# Patient Record
Sex: MALE | Race: WHITE | Employment: UNEMPLOYED | URBAN - METROPOLITAN AREA
[De-identification: names, ages, dates, MRNs, and addresses within clinical notes are randomized per-mention and may not be internally consistent; named-entity substitution may affect disease eponyms.]

---

## 2017-12-15 ENCOUNTER — HOSPITAL ENCOUNTER (EMERGENCY)
Age: 27
Discharge: HOME | End: 2017-12-15
Attending: EMERGENCY MEDICINE
Payer: SELF-PAY

## 2017-12-15 VITALS
OXYGEN SATURATION: 98 % | BODY MASS INDEX: 31.18 KG/M2 | TEMPERATURE: 98.2 F | HEART RATE: 66 BPM | SYSTOLIC BLOOD PRESSURE: 117 MMHG | WEIGHT: 194 LBS | HEIGHT: 66 IN | DIASTOLIC BLOOD PRESSURE: 73 MMHG | RESPIRATION RATE: 16 BRPM

## 2017-12-15 DIAGNOSIS — K02.9 PAIN DUE TO DENTAL CARIES: Primary | ICD-10-CM

## 2017-12-15 PROCEDURE — 99282 EMERGENCY DEPT VISIT SF MDM: CPT

## 2017-12-15 RX ORDER — PENICILLIN V POTASSIUM 500 MG/1
500 TABLET, FILM COATED ORAL 4 TIMES DAILY
Qty: 28 TAB | Refills: 0 | Status: SHIPPED | OUTPATIENT
Start: 2017-12-15 | End: 2018-05-04

## 2017-12-15 RX ORDER — IBUPROFEN 600 MG/1
600 TABLET ORAL
Qty: 20 TAB | Refills: 0 | Status: SHIPPED | OUTPATIENT
Start: 2017-12-15 | End: 2018-05-04

## 2017-12-16 NOTE — ED PROVIDER NOTES
EMERGENCY DEPARTMENT HISTORY AND PHYSICAL EXAM    8:14 PM      Date: 12/15/2017  Patient Name: Liam Horan    History of Presenting Illness     Chief Complaint   Patient presents with    Dental Pain         History Provided By: Patient    Chief Complaint: Right, upper dental pain  Duration:  Months (x6)  Timing:  Chronic  Location: Right, upper molar  Quality: Sharp  Severity: 10 out of 10  Modifying Factors: No identifiable modifying factors  Associated Symptoms: denies any other associated signs or symptoms      Additional History (Context): Liam Horan is a 32 y.o. male with hypertension who presents to the ED c/o chronic right upper dental pain. Pt moved here from [place] earlier this year and was told prior to moving that he needed to have that tooth extracted prior to moving, although he did not follow through with that. Pt was in the ED six month ago for the same dental complication and was prescriped antibiotics at this visit. The pt states he did not see a dentist following that ED visit because he does not have insurance and he did not think he was given the reference for affordable dental care. Pt admits to smoking cigarettes. PCP: None    Current Outpatient Prescriptions   Medication Sig Dispense Refill    metoprolol tartrate (LOPRESSOR) 25 mg tablet Take 25 mg by mouth two (2) times a day. Indications: HYPERTENSION, Pt states it's prescribed 1-2 times AS NEEDED         Past History     Past Medical History:  Past Medical History:   Diagnosis Date    Hypertension        Past Surgical History:  History reviewed. No pertinent surgical history. Family History:  History reviewed. No pertinent family history. Social History:  Social History   Substance Use Topics    Smoking status: Current Every Day Smoker    Smokeless tobacco: Never Used    Alcohol use Yes       Allergies:  No Known Allergies      Review of Systems     Review of Systems   Constitutional: Negative.     HENT: Positive for dental problem (right upper). All other systems reviewed and are negative. Physical Exam     Visit Vitals    /73 (BP 1 Location: Right arm, BP Patient Position: At rest)    Pulse 66    Temp 98.2 °F (36.8 °C)    Resp 16    Ht 5' 6\" (1.676 m)    Wt 88 kg (194 lb)    SpO2 98%    BMI 31.31 kg/m2     Physical Exam   Constitutional: He is oriented to person, place, and time. He appears well-developed and well-nourished. No distress. HENT:   Head: Normocephalic and atraumatic. Mouth/Throat: Oropharynx is clear and moist.   Right upper molar erupting with poor dentition throughout; no fluctuance; mild gingival inflammation    Eyes: Conjunctivae and EOM are normal. Pupils are equal, round, and reactive to light. No scleral icterus. Neck: Normal range of motion. Neck supple. Cardiovascular: Normal rate, regular rhythm and normal heart sounds. No murmur heard. Pulmonary/Chest: Effort normal and breath sounds normal. No respiratory distress. Abdominal: Soft. Bowel sounds are normal. He exhibits no distension. There is no tenderness. Musculoskeletal: He exhibits no edema. Lymphadenopathy:     He has no cervical adenopathy. Neurological: He is alert and oriented to person, place, and time. Coordination normal.   Skin: Skin is warm and dry. No rash noted. Psychiatric: He has a normal mood and affect. His behavior is normal.   Nursing note and vitals reviewed. Diagnostic Study Results     Labs -  No results found for this or any previous visit (from the past 12 hour(s)). Radiologic Studies -   No orders to display         Medical Decision Making   I am the first provider for this patient. I reviewed the vital signs, available nursing notes, past medical history, past surgical history, family history and social history. Vital Signs-Reviewed the patient's vital signs. Pulse Oximetry Analysis -  98% on room air     Records Reviewed:  Old Medical Records (Time of Review: 8:14 PM)    ED Course: Progress Notes, Reevaluation, and Consults:      Provider Notes (Medical Decision Making): ? Infection vs wisdom tooth eruption will tx referred to Dental follow up     Procedures:     Core Measures:     Critical Care Time:       Diagnosis     Clinical Impression:   1. Pain due to dental caries        Disposition: home     Follow-up Information     None           Patient's Medications   Start Taking    No medications on file   Continue Taking    METOPROLOL TARTRATE (LOPRESSOR) 25 MG TABLET    Take 25 mg by mouth two (2) times a day. Indications: HYPERTENSION, Pt states it's prescribed 1-2 times AS NEEDED   These Medications have changed    No medications on file   Stop Taking    No medications on file     _______________________________    Attestations:  [address] [place] [address] acting as a scribe for and in the presence of Sharon Shah MD      December 15, 2017 at 8:14 PM       Provider Attestation:      I personally performed the services described in the documentation, reviewed the documentation, as recorded by the scribe in my presence, and it accurately and completely records my words and actions.  December 15, 2017 at 8:14 PM - Sharon Shah MD    _______________________________

## 2017-12-16 NOTE — DISCHARGE INSTRUCTIONS
Tooth Decay: Care Instructions  Your Care Instructions    Tooth decay is damage to a tooth caused by plaque. Plaque is a thin film of bacteria that sticks to the teeth above and below the gum line. If plaque isn't removed from the teeth, it can build up and harden into tartar. The bacteria in plaque and tartar use sugars in food to make acids. These acids can cause tooth decay and gum disease. Any part of your tooth can decay, from the roots below the gum line to the chewing surface. Decay can affect the outer layer (enamel) or inner layer (dentin) of your teeth. The deeper the decay, the worse the damage. Untreated tooth decay will get worse and may lead to tooth loss. If you have a small hole (cavity) in your tooth, your dentist can repair it by removing the decay and filling the hole. If you have deeper decay, you may need more treatment. A very badly damaged tooth may have to be removed. Follow-up care is a key part of your treatment and safety. Be sure to make and go to all appointments, and call your dentist if you are having problems. It's also a good idea to know your test results and keep a list of the medicines you take. How can you care for yourself at home? If you have pain:  · Take an over-the-counter pain medicine, such as acetaminophen (Tylenol), ibuprofen (Advil, Motrin), or naproxen (Aleve). Be safe with medicines. Read and follow all instructions on the label. ¨ Do not take two or more pain medicines at the same time unless the doctor told you to. Many pain medicines have acetaminophen, which is Tylenol. Too much acetaminophen (Tylenol) can be harmful. · Put ice or a cold pack on your cheek over the tooth for 10 to 15 minutes at a time. Put a thin cloth between the ice and your skin. To prevent tooth decay  · Brush teeth twice a day, and floss once a day. Brushing with fluoride toothpaste and flossing may be enough to reverse early decay.   · Use a toothbrush with soft, rounded-end bristles and a head that is small enough to reach all parts of your teeth and mouth. Replace your toothbrush every 3 or 4 months. You may also use an electric toothbrush that has rotating and oscillating (back-and-forth) action. · Ask your dentist about having fluoride treatments at the dental office. · Brush your tongue to help get rid of bacteria. · Eat healthy foods that include whole grains, vegetables, and fruits. · Have your teeth cleaned by a professional at least two times a year. · Do not smoke or use smokeless tobacco. Tobacco can make tooth decay worse. When should you call for help? Call 911 anytime you think you may need emergency care. For example, call if:  ? · You have trouble breathing. ?Call your dentist now or seek immediate medical care if:  ? · You have new or worse symptoms of infection, such as:  ¨ Increased pain, swelling, warmth, or redness. ¨ Red streaks leading from the area. ¨ Pus draining from the area. ¨ A fever. ? Watch closely for changes in your health, and be sure to contact your doctor if:  ? · You do not get better as expected. Where can you learn more? Go to http://prashanth-robel.info/. Enter T244 in the search box to learn more about \"Tooth Decay: Care Instructions. \"  Current as of: May 12, 2017  Content Version: 11.4  © 3590-1684 [employer]. Care instructions adapted under license by [employer] (which disclaims liability or warranty for this information). If you have questions about a medical condition or this instruction, always ask your healthcare professional. [place] [address] any warranty or liability for your use of this information.

## 2018-01-11 NOTE — MISCELLANEOUS
Message  Pt was admitted 6/18/16 to [facility] with suicidal ideation and discharged 6/23/16  Pt stated he is homeless and is in [place] with his mother and will follow up there  Declined to make [facility] appointment at this time  Hospital documents obtained  [address]  Active Problems    1  ADHD (attention deficit hyperactivity disorder), combined type (314 01) (F90 2)   2  Benign essential hypertension (401 1) (I10)   3  Bipolar affective disorder (296 80) (F31 9)   4  CN (constipation) (564 00) (K59 00)   5  Generalized anxiety disorder (300 02) (F41 1)   6  Influenza vaccine needed (V04 81) (Z23)   7  Marijuana abuse (305 20) (F12 10)   8  Need for Tdap vaccination (V06 1) (Z23)   9  Sciatica of right side (724 3) (M54 31)   10  Sinus infection (473 9) (J32 9)   11  Tooth abscess (522 5) (K04 7)    Current Meds   1  Adderall XR 30 MG Oral Capsule Extended Release 24 Hour   (Amphetamine-Dextroamphet ER); TAKE 2 CAPSULE Daily; Therapy: 87UUH0339 to (Evaluate:21Fga7745); Last Rx:04Utx2624 Ordered   2  ALPRAZolam 1 MG Oral Tablet; TAKE 1 TABLET TWICE DAILY; Therapy: 40UON2969 to (Evaluate:25Mar2016); Last Rx:69Ujf5188 Ordered   3  Brintellix 10 MG TABS; Take 1 tablet daily; Therapy: 28TTN8708 to (Evaluate:17Jun2016)  Requested for: 21Mar2016; Last   Rx:19Mar2016 Ordered   4  Clindamycin HCl - 300 MG Oral Capsule; TAKE 1 CAPSULE EVERY 6 HOURS DAILY; Therapy: 97RNL1463 to (Donelda Violet)  Requested for: 77TAV5579; Last   Rx:15Mar2016 Ordered   5  Docusate Sodium 100 MG Oral Capsule; TAKE 1 CAPSULE TWICE DAILY AS NEEDED; Therapy: 22TTV6669 to (Last Rx:12Nov2015) Ordered   6  Lactulose 10 GM/15ML Oral Solution; TAKE 2 TBSP TWICE DAILY  Requested for:   21OOY1045; Last Rx:17Mar2016 Ordered   7  Metoprolol Tartrate 25 MG Oral Tablet; take one tablet by mouth twice daily; Therapy: 62Ksu6929 to (Evaluate:63Yvh6459)  Requested for: 39IYP1717; Last   Rx:15Mar2016 Ordered   8   Naproxen 500 MG Oral Tablet; TAKE 1 TABLET EVERY 12 HOURS WITH FOOD; Therapy: 63Czw7247 to ([phone])  Requested for: 94NLK3523; Last   Rx:19Jan2016 Ordered   9  Nystop 010509 UNIT/GM External Powder; APPLY 2-3 TIMES DAILY TO AFFECTED   AREA(S); Therapy: 02Yzp1615 to (Last Rx:15Mar2016)  Requested for: 86ETW7722 Ordered   10  Omeprazole 20 MG Oral Tablet Delayed Release; Take one capsule 2x daily; Therapy: 59AXO8082 to (Last Rx:15Mar2016)  Requested for: 27IDG4890 Ordered   11  Penicillin V Potassium 500 MG Oral Tablet; Take 1 twice daily; Therapy: 12NEQ0362 to (Last IQ:01MXR3892)  Requested for: 99WVU3787 Ordered   12  Proventil  (90 Base) MCG/ACT Inhalation Aerosol Solution; INHALE 1 TO 2    PUFFS EVERY 4 TO 6 HOURS AS NEEDED; Therapy: 70Kma3757 to Recorded   13  Suboxone 8-2 MG Sublingual Film; use as directed; Therapy: (Recorded:12Nov2015) to Recorded    Allergies    1  No Known Drug Allergies    2   Seasonal    Signatures   Electronically signed by : Lizabeth Fernandez, ; Jun 27 2016  1:37PM EST                       (Author)

## 2018-05-04 ENCOUNTER — APPOINTMENT (OUTPATIENT)
Dept: GENERAL RADIOLOGY | Age: 28
End: 2018-05-04
Attending: EMERGENCY MEDICINE
Payer: SELF-PAY

## 2018-05-04 ENCOUNTER — HOSPITAL ENCOUNTER (EMERGENCY)
Age: 28
Discharge: HOME | End: 2018-05-04
Attending: EMERGENCY MEDICINE | Admitting: EMERGENCY MEDICINE
Payer: SELF-PAY

## 2018-05-04 VITALS
WEIGHT: 187 LBS | BODY MASS INDEX: 30.18 KG/M2 | HEART RATE: 50 BPM | OXYGEN SATURATION: 98 % | RESPIRATION RATE: 17 BRPM | TEMPERATURE: 97.4 F | DIASTOLIC BLOOD PRESSURE: 61 MMHG | SYSTOLIC BLOOD PRESSURE: 112 MMHG

## 2018-05-04 DIAGNOSIS — R00.2 PALPITATIONS: Primary | ICD-10-CM

## 2018-05-04 LAB
ANION GAP SERPL CALC-SCNC: 6 MMOL/L (ref 3–18)
BASOPHILS # BLD: 0 K/UL (ref 0–0.06)
BASOPHILS NFR BLD: 1 % (ref 0–2)
BUN SERPL-MCNC: 7 MG/DL (ref 7–18)
BUN/CREAT SERPL: 7 (ref 12–20)
CALCIUM SERPL-MCNC: 8.7 MG/DL (ref 8.5–10.1)
CHLORIDE SERPL-SCNC: 107 MMOL/L (ref 100–108)
CO2 SERPL-SCNC: 28 MMOL/L (ref 21–32)
CREAT SERPL-MCNC: 1 MG/DL (ref 0.6–1.3)
DIFFERENTIAL METHOD BLD: NORMAL
EOSINOPHIL # BLD: 0.1 K/UL (ref 0–0.4)
EOSINOPHIL NFR BLD: 1 % (ref 0–5)
ERYTHROCYTE [DISTWIDTH] IN BLOOD BY AUTOMATED COUNT: 12.3 % (ref 11.6–14.5)
GLUCOSE SERPL-MCNC: 82 MG/DL (ref 74–99)
HCT VFR BLD AUTO: 41.3 % (ref 36–48)
HGB BLD-MCNC: 14.5 G/DL (ref 13–16)
LYMPHOCYTES # BLD: 1.3 K/UL (ref 0.9–3.6)
LYMPHOCYTES NFR BLD: 22 % (ref 21–52)
MCH RBC QN AUTO: 30.5 PG (ref 24–34)
MCHC RBC AUTO-ENTMCNC: 35.1 G/DL (ref 31–37)
MCV RBC AUTO: 86.8 FL (ref 74–97)
MONOCYTES # BLD: 0.6 K/UL (ref 0.05–1.2)
MONOCYTES NFR BLD: 9 % (ref 3–10)
NEUTS SEG # BLD: 4 K/UL (ref 1.8–8)
NEUTS SEG NFR BLD: 67 % (ref 40–73)
PLATELET # BLD AUTO: 226 K/UL (ref 135–420)
PMV BLD AUTO: 9.4 FL (ref 9.2–11.8)
POTASSIUM SERPL-SCNC: 3.7 MMOL/L (ref 3.5–5.5)
RBC # BLD AUTO: 4.76 M/UL (ref 4.7–5.5)
SODIUM SERPL-SCNC: 141 MMOL/L (ref 136–145)
WBC # BLD AUTO: 5.9 K/UL (ref 4.6–13.2)

## 2018-05-04 PROCEDURE — 74011250636 HC RX REV CODE- 250/636: Performed by: EMERGENCY MEDICINE

## 2018-05-04 PROCEDURE — 93005 ELECTROCARDIOGRAM TRACING: CPT

## 2018-05-04 PROCEDURE — 96360 HYDRATION IV INFUSION INIT: CPT

## 2018-05-04 PROCEDURE — 99284 EMERGENCY DEPT VISIT MOD MDM: CPT

## 2018-05-04 PROCEDURE — 80048 BASIC METABOLIC PNL TOTAL CA: CPT | Performed by: PHYSICIAN ASSISTANT

## 2018-05-04 PROCEDURE — 85025 COMPLETE CBC W/AUTO DIFF WBC: CPT | Performed by: PHYSICIAN ASSISTANT

## 2018-05-04 PROCEDURE — 71045 X-RAY EXAM CHEST 1 VIEW: CPT

## 2018-05-04 RX ADMIN — SODIUM CHLORIDE 1000 ML: 900 INJECTION, SOLUTION INTRAVENOUS at 20:14

## 2018-05-05 NOTE — ED PROVIDER NOTES
EMERGENCY DEPARTMENT HISTORY AND PHYSICAL EXAM    8:31 PM      Date: 5/4/2018  Patient Name: Lawanda Hubbard    History of Presenting Illness     Chief Complaint   Patient presents with    Palpitations    Dizziness         History Provided By: Patient    Chief Complaint: dizziness  Duration:  Hours  Timing:  Acute  Location: n/a  Quality: n/a  Severity: Moderate  Modifying Factors: Denies any modifying factors. Associated Symptoms: palpitations, SOB, abd pain, diarrhea      Additional History (Context): Lawanda Hubbard is a 32 y.o. male with a hx of HTN, who presents to the ED with acute dizziness onset today. Associated Sx include palpitations, abd pain, diarrhea, and SOB. The pt does not currently have any palpitations, but states when he does it feels like his \"heart is racing\". Denies any drug use, alcohol use, fever, cough, or vomiting. The pt reports that he is constantly \"stressed\". The pt works outside cleaning boats. Denies any modifying factors. No further complaints at this time. PCP: None    Current Outpatient Prescriptions   Medication Sig Dispense Refill    metoprolol tartrate (LOPRESSOR) 25 mg tablet Take 25 mg by mouth two (2) times a day. Indications: HYPERTENSION, Pt states it's prescribed 1-2 times AS NEEDED         Past History     Past Medical History:  Past Medical History:   Diagnosis Date    Hypertension        Past Surgical History:  History reviewed. No pertinent surgical history. Family History:  History reviewed. No pertinent family history. Social History:  Social History   Substance Use Topics    Smoking status: Current Every Day Smoker    Smokeless tobacco: Never Used    Alcohol use Yes       Allergies:  No Known Allergies      Review of Systems       Review of Systems   Constitutional: Negative for fever. Respiratory: Positive for shortness of breath. Negative for cough. Cardiovascular: Positive for palpitations.    Gastrointestinal: Positive for abdominal pain and diarrhea. Negative for vomiting. Neurological: Positive for dizziness. All other systems reviewed and are negative. Physical Exam     Visit Vitals    /61    Pulse (!) 50    Temp 97.4 °F (36.3 °C)    Resp 17    Wt 84.8 kg (187 lb)    SpO2 98%    BMI 30.18 kg/m2         Physical Exam   Constitutional:   General:  Well-developed, well-nourished, no apparent distress. Nontoxic nondiaphoretic  Head:  Normocephalic atraumatic. Eyes:  Pupils midrange extraocular movements intact. No pallor or conjunctival injection. Nose:  No rhinorrhea, inspection grossly normal.    Ears:  Grossly normal to inspection, no discharge. Mouth:  Mucous membranes moist, no appreciable intraoral lesion. Neck/Back:  Trachea midline, no asymmetry. Chest:  Grossly normal inspection, symmetric chest rise. Pulmonary:  Clear to auscultation bilaterally no wheezes rhonchi or rales. Cardiovascular:  S1-S2 no murmurs rubs or gallops. Abdomen: Soft, nontender, nondistended no guarding rebound or peritoneal signs. Extremities:  Grossly normal to inspection, peripheral pulses intact    Neurologic:  Alert and oriented no appreciable focal neurologic deficit. Skin:  Warm and dry  Psychiatric:  Grossly normal mood and affect. Nursing note reviewed, vital signs reviewed. Diagnostic Study Results     Labs -  Recent Results (from the past 12 hour(s))   EKG, 12 LEAD, INITIAL    Collection Time: 05/04/18  7:23 PM   Result Value Ref Range    Ventricular Rate 66 BPM    Atrial Rate 66 BPM    P-R Interval 160 ms    QRS Duration 108 ms    Q-T Interval 384 ms    QTC Calculation (Bezet) 402 ms    Calculated P Axis 61 degrees    Calculated R Axis 16 degrees    Calculated T Axis 38 degrees    Diagnosis       Normal sinus rhythm with sinus arrhythmia  Incomplete right bundle branch block  Borderline ECG  When compared with ECG of 26-JUL-2016 16:52,  Vent.  rate has increased BY  22 BPM  Incomplete right bundle branch block is now present     METABOLIC PANEL, BASIC    Collection Time: 05/04/18  8:00 PM   Result Value Ref Range    Sodium 141 136 - 145 mmol/L    Potassium 3.7 3.5 - 5.5 mmol/L    Chloride 107 100 - 108 mmol/L    CO2 28 21 - 32 mmol/L    Anion gap 6 3.0 - 18 mmol/L    Glucose 82 74 - 99 mg/dL    BUN 7 7.0 - 18 MG/DL    Creatinine 1.00 0.6 - 1.3 MG/DL    BUN/Creatinine ratio 7 (L) 12 - 20      GFR est AA >60 >60 ml/min/1.73m2    GFR est non-AA >60 >60 ml/min/1.73m2    Calcium 8.7 8.5 - 10.1 MG/DL   CBC WITH AUTOMATED DIFF    Collection Time: 05/04/18  8:00 PM   Result Value Ref Range    WBC 5.9 4.6 - 13.2 K/uL    RBC 4.76 4.70 - 5.50 M/uL    HGB 14.5 13.0 - 16.0 g/dL    HCT 41.3 36.0 - 48.0 %    MCV 86.8 74.0 - 97.0 FL    MCH 30.5 24.0 - 34.0 PG    MCHC 35.1 31.0 - 37.0 g/dL    RDW 12.3 11.6 - 14.5 %    PLATELET 942 174 - 350 K/uL    MPV 9.4 9.2 - 11.8 FL    NEUTROPHILS 67 40 - 73 %    LYMPHOCYTES 22 21 - 52 %    MONOCYTES 9 3 - 10 %    EOSINOPHILS 1 0 - 5 %    BASOPHILS 1 0 - 2 %    ABS. NEUTROPHILS 4.0 1.8 - 8.0 K/UL    ABS. LYMPHOCYTES 1.3 0.9 - 3.6 K/UL    ABS. MONOCYTES 0.6 0.05 - 1.2 K/UL    ABS. EOSINOPHILS 0.1 0.0 - 0.4 K/UL    ABS. BASOPHILS 0.0 0.0 - 0.06 K/UL    DF AUTOMATED         Radiologic Studies -   XR CHEST PORT    (Results Pending)         Medical Decision Making   I am the first provider for this patient. I reviewed the vital signs, available nursing notes, past medical history, past surgical history, family history and social history. Vital Signs-Reviewed the patient's vital signs. ED course:  Patient presents with palpitations, shortness of breath. He is afebrile and not tachycardic saturation normal on room air    Monitor: Normal sinus rhythm    Doubtul serious intra-abdominal intrathoracic pathology, doubtful arrhythmia, will hydrate here as he has been working outside may be dehydrated    EKG done at 1923 hrs.: No signs of heart 66 intervals within normal limits.   No ST changes incomplete RIGHT bundle-branch block morphology      Labs unremarkable    Is stable for outpatient management and referred to mali Alvarez precautions    Patient's presentation, history, physical exam and laboratory evaluations were reviewed. At this time patient was felt to be stable for outpatient management and follow with primary care/specialist.  Patient was instructed to return to the emergency department with any concerns. Disposition:    Discharged home      Portions of this chart were created with Dragon medical speech to text program.   Unrecognized errors may be present. Follow-up Information     Follow up With Details Comments [address] Call in 2 days  [address] EMERGENCY DEPT  As needed, If symptoms worsen [address]  [phone]           Discharge Medication List as of 5/4/2018  9:07 PM      CONTINUE these medications which have NOT CHANGED    Details   metoprolol tartrate (LOPRESSOR) 25 mg tablet Take 25 mg by mouth two (2) times a day. Indications: HYPERTENSION, Pt states it's prescribed 1-2 times AS NEEDED, Historical Med           _______________________________    Attestations:  Scribe Attestation     Tegan Fontaine acting as a scribe for and in the presence of Joi Moctezuma MD      May 04, 2018 at 8:31 PM       Provider Attestation:      I personally performed the services described in the documentation, reviewed the documentation, as recorded by the scribe in my presence, and it accurately and completely records my words and actions.  May 04, 2018 at 8:31 PM - oJi Moctezuma MD    _______________________________

## 2018-05-05 NOTE — DISCHARGE INSTRUCTIONS
Palpitations: Care Instructions  Your Care Instructions    Heart palpitations are the uncomfortable sensation that your heart is beating fast or irregularly. You might feel pounding or fluttering in your chest. It might feel like your heart is skipping a beat. Although palpitations may be caused by a heart problem, they also occur because of stress, fatigue, or use of alcohol, caffeine, or nicotine. Many medicines, including diet pills, antihistamines, decongestants, and some herbal products, can cause heart palpitations. Nearly everyone has palpitations from time to time. Depending on your symptoms, your doctor may need to do more tests to try to find the cause of your palpitations. Follow-up care is a key part of your treatment and safety. Be sure to make and go to all appointments, and call your doctor if you are having problems. It's also a good idea to know your test results and keep a list of the medicines you take. How can you care for yourself at home? · Avoid caffeine, nicotine, and excess alcohol. · Do not take illegal drugs, such as methamphetamines and cocaine. · Do not take weight loss or diet medicines unless you talk with your doctor first.  · Get plenty of sleep. · Do not overeat. · If you have palpitations again, take deep breaths and try to relax. This may slow a racing heart. · If you start to feel lightheaded, lie down to avoid injuries that might result if you pass out and fall down. · Keep a record of your palpitations and bring it to your next doctor's appointment. Write down:  ¨ The date and time. ¨ Your pulse. (If your heart is beating fast, it may be hard to count your pulse.)  ¨ What you were doing when the palpitations started. ¨ How long the palpitations lasted. ¨ Any other symptoms. · If an activity causes palpitations, slow down or stop. Talk to your doctor before you do that activity again. · Take your medicines exactly as prescribed.  Call your doctor if you think you are having a problem with your medicine. When should you call for help? Call 911 anytime you think you may need emergency care. For example, call if:  ? · You passed out (lost consciousness). ? · You have symptoms of a heart attack. These may include:  ¨ Chest pain or pressure, or a strange feeling in the chest.  ¨ Sweating. ¨ Shortness of breath. ¨ Pain, pressure, or a strange feeling in the back, neck, jaw, or upper belly or in one or both shoulders or arms. ¨ Lightheadedness or sudden weakness. ¨ A fast or irregular heartbeat. After you call 911, the  may tell you to chew 1 adult-strength or 2 to 4 low-dose aspirin. Wait for an ambulance. Do not try to drive yourself. ? · You have symptoms of a stroke. These may include:  ¨ Sudden numbness, tingling, weakness, or loss of movement in your face, arm, or leg, especially on only one side of your body. ¨ Sudden vision changes. ¨ Sudden trouble speaking. ¨ Sudden confusion or trouble understanding simple statements. ¨ Sudden problems with walking or balance. ¨ A sudden, severe headache that is different from past headaches. ?Call your doctor now or seek immediate medical care if:  ? · You have heart palpitations and:  ¨ Are dizzy or lightheaded, or you feel like you may faint. ¨ Have new or increased shortness of breath. ? Watch closely for changes in your health, and be sure to contact your doctor if:  ? · You continue to have heart palpitations. Where can you learn more? Go to http://prashanth-robel.info/. Enter R508 in the search box to learn more about \"Palpitations: Care Instructions. \"  Current as of: September 21, 2016  Content Version: 11.4  © 1740-8433 [employer]. Care instructions adapted under license by [employer] (which disclaims liability or warranty for this information).  If you have questions about a medical condition or this instruction, always ask your healthcare professional. [address] any warranty or liability for your use of this information.

## 2018-05-05 NOTE — ED NOTES
Pt states he feels off-balance and his heart races. Has h/o panic attacks, used to take metoprolol about a yr ago.

## 2018-05-06 LAB
ATRIAL RATE: 66 BPM
CALCULATED P AXIS, ECG09: 61 DEGREES
CALCULATED R AXIS, ECG10: 16 DEGREES
CALCULATED T AXIS, ECG11: 38 DEGREES
DIAGNOSIS, 93000: NORMAL
P-R INTERVAL, ECG05: 160 MS
Q-T INTERVAL, ECG07: 384 MS
QRS DURATION, ECG06: 108 MS
QTC CALCULATION (BEZET), ECG08: 402 MS
VENTRICULAR RATE, ECG03: 66 BPM